# Patient Record
Sex: MALE | Race: WHITE | Employment: OTHER | URBAN - METROPOLITAN AREA
[De-identification: names, ages, dates, MRNs, and addresses within clinical notes are randomized per-mention and may not be internally consistent; named-entity substitution may affect disease eponyms.]

---

## 2019-07-14 ENCOUNTER — APPOINTMENT (OUTPATIENT)
Dept: GENERAL RADIOLOGY | Age: 54
End: 2019-07-14

## 2019-07-14 ENCOUNTER — HOSPITAL ENCOUNTER (EMERGENCY)
Age: 54
Discharge: HOME | End: 2019-07-14

## 2019-07-14 VITALS
OXYGEN SATURATION: 99 % | TEMPERATURE: 98.4 F | DIASTOLIC BLOOD PRESSURE: 66 MMHG | WEIGHT: 195.99 LBS | RESPIRATION RATE: 18 BRPM | HEIGHT: 73 IN | BODY MASS INDEX: 25.98 KG/M2 | HEART RATE: 88 BPM | SYSTOLIC BLOOD PRESSURE: 108 MMHG

## 2019-07-14 DIAGNOSIS — S80.12XA CONTUSION OF LEFT LOWER LEG, INITIAL ENCOUNTER: Primary | ICD-10-CM

## 2019-07-14 LAB
A/G RATIO: 1.5 (ref 1.1–2.2)
ALBUMIN SERPL-MCNC: 3.8 G/DL (ref 3.4–5)
ALP BLD-CCNC: 67 U/L (ref 40–129)
ALT SERPL-CCNC: 12 U/L (ref 10–40)
ANION GAP SERPL CALCULATED.3IONS-SCNC: 11 MMOL/L (ref 3–16)
AST SERPL-CCNC: 15 U/L (ref 15–37)
BASOPHILS ABSOLUTE: 0 K/UL (ref 0–0.2)
BASOPHILS RELATIVE PERCENT: 0.7 %
BILIRUB SERPL-MCNC: <0.2 MG/DL (ref 0–1)
BUN BLDV-MCNC: 10 MG/DL (ref 7–20)
CALCIUM SERPL-MCNC: 9.1 MG/DL (ref 8.3–10.6)
CHLORIDE BLD-SCNC: 101 MMOL/L (ref 99–110)
CO2: 26 MMOL/L (ref 21–32)
CREAT SERPL-MCNC: 0.9 MG/DL (ref 0.9–1.3)
D DIMER: <200 NG/ML DDU (ref 0–229)
EOSINOPHILS ABSOLUTE: 0.5 K/UL (ref 0–0.6)
EOSINOPHILS RELATIVE PERCENT: 7.1 %
GFR AFRICAN AMERICAN: >60
GFR NON-AFRICAN AMERICAN: >60
GLOBULIN: 2.5 G/DL
GLUCOSE BLD-MCNC: 94 MG/DL (ref 70–99)
HCT VFR BLD CALC: 38.6 % (ref 40.5–52.5)
HEMOGLOBIN: 12.9 G/DL (ref 13.5–17.5)
LYMPHOCYTES ABSOLUTE: 2.3 K/UL (ref 1–5.1)
LYMPHOCYTES RELATIVE PERCENT: 32.6 %
MCH RBC QN AUTO: 31.2 PG (ref 26–34)
MCHC RBC AUTO-ENTMCNC: 33.4 G/DL (ref 31–36)
MCV RBC AUTO: 93.3 FL (ref 80–100)
MONOCYTES ABSOLUTE: 0.6 K/UL (ref 0–1.3)
MONOCYTES RELATIVE PERCENT: 8.7 %
NEUTROPHILS ABSOLUTE: 3.6 K/UL (ref 1.7–7.7)
NEUTROPHILS RELATIVE PERCENT: 50.9 %
PDW BLD-RTO: 13.3 % (ref 12.4–15.4)
PLATELET # BLD: 262 K/UL (ref 135–450)
PMV BLD AUTO: 7.8 FL (ref 5–10.5)
POTASSIUM SERPL-SCNC: 4 MMOL/L (ref 3.5–5.1)
RBC # BLD: 4.14 M/UL (ref 4.2–5.9)
SODIUM BLD-SCNC: 138 MMOL/L (ref 136–145)
TOTAL PROTEIN: 6.3 G/DL (ref 6.4–8.2)
WBC # BLD: 7.1 K/UL (ref 4–11)

## 2019-07-14 PROCEDURE — 99283 EMERGENCY DEPT VISIT LOW MDM: CPT

## 2019-07-14 PROCEDURE — 80053 COMPREHEN METABOLIC PANEL: CPT

## 2019-07-14 PROCEDURE — 85025 COMPLETE CBC W/AUTO DIFF WBC: CPT

## 2019-07-14 PROCEDURE — 85379 FIBRIN DEGRADATION QUANT: CPT

## 2019-07-14 PROCEDURE — 73560 X-RAY EXAM OF KNEE 1 OR 2: CPT

## 2019-07-14 ASSESSMENT — PAIN DESCRIPTION - LOCATION: LOCATION: KNEE

## 2019-07-14 ASSESSMENT — PAIN DESCRIPTION - ONSET: ONSET: ON-GOING

## 2019-07-14 ASSESSMENT — PAIN DESCRIPTION - ORIENTATION: ORIENTATION: LEFT

## 2019-07-14 ASSESSMENT — PAIN SCALES - GENERAL: PAINLEVEL_OUTOF10: 5

## 2019-07-14 ASSESSMENT — ENCOUNTER SYMPTOMS: COLOR CHANGE: 1

## 2019-07-14 ASSESSMENT — PAIN - FUNCTIONAL ASSESSMENT: PAIN_FUNCTIONAL_ASSESSMENT: PREVENTS OR INTERFERES SOME ACTIVE ACTIVITIES AND ADLS

## 2019-07-14 ASSESSMENT — PAIN DESCRIPTION - PROGRESSION: CLINICAL_PROGRESSION: GRADUALLY WORSENING

## 2019-07-14 ASSESSMENT — PAIN DESCRIPTION - PAIN TYPE: TYPE: ACUTE PAIN

## 2019-07-14 ASSESSMENT — PAIN DESCRIPTION - FREQUENCY: FREQUENCY: INTERMITTENT

## 2019-07-14 ASSESSMENT — PAIN DESCRIPTION - DESCRIPTORS: DESCRIPTORS: ACHING

## 2019-07-15 NOTE — ED PROVIDER NOTES
as needed for Pain      ranitidine (ZANTAC) 150 MG tablet Take 1 tablet by mouth 2 times daily, Disp-60 tablet, R-3             ALLERGIES     Codeine    FAMILY HISTORY     No family history on file. No family status information on file. SOCIAL HISTORY      reports that he has been smoking cigarettes. He has been smoking about 1.00 pack per day. He does not have any smokeless tobacco history on file. He reports that he does not drink alcohol or use drugs. PHYSICAL EXAM    (up to 7 for level 4, 8 or more for level 5)     ED Triage Vitals [07/14/19 1733]   BP Temp Temp Source Pulse Resp SpO2 Height Weight   102/64 98.4 °F (36.9 °C) Oral 86 16 97 % 6' 1\" (1.854 m) 195 lb 15.8 oz (88.9 kg)     Physical Exam   Constitutional: He is oriented to person, place, and time. He appears well-developed and well-nourished. No distress. HENT:   Head: Normocephalic and atraumatic. Cardiovascular: Normal rate and intact distal pulses. Pulmonary/Chest: Effort normal.   Musculoskeletal: Normal range of motion. Left knee: He exhibits normal range of motion. Tenderness found. Legs:  Neurological: He is alert and oriented to person, place, and time. No cranial nerve deficit. Skin: Skin is warm. Psychiatric: He has a normal mood and affect. His behavior is normal.   Nursing note and vitals reviewed. DIAGNOSTIC RESULTS     RADIOLOGY:   Non-plain film images such as CT, Ultrasound and MRI are read by the radiologist. Plain radiographic images are visualized and preliminarily interpreted by EUN Roy with the below findings:    Reviewed radiologist dictation. Interpretation per the Radiologist below, if available at the time of this note:    XR KNEE LEFT (1-2 VIEWS)   Final Result   No acute osseous abnormality of the left knee. Tricompartment osteoarthritic   changes with small joint effusion.                LABS:  Labs Reviewed   CBC WITH AUTO DIFFERENTIAL - Abnormal; Notable for the following components:       Result Value    RBC 4.14 (*)     Hemoglobin 12.9 (*)     Hematocrit 38.6 (*)     All other components within normal limits    Narrative:     Performed at:  [facility]  [address] [place], [place] [address]   Phone [phone]   COMPREHENSIVE METABOLIC PANEL - Abnormal; Notable for the following components: Total Protein 6.3 (*)     All other components within normal limits    Narrative:     Performed at:  [facility]  [address] [place], [place] [address]   Phone [phone]   D-DIMER, QUANTITATIVE    Narrative:     Performed at:  [facility]  [address] [place], [place] [address]   Phone [phone]       All other labs were within normal range or not returned as of this dictation. EMERGENCY DEPARTMENT COURSE and DIFFERENTIAL DIAGNOSIS/MDM:   Vitals:    Vitals:    07/14/19 1733 07/14/19 2026   BP: 102/64 108/66   Pulse: 86 88   Resp: 16 18   Temp: 98.4 °F (36.9 °C) 98.4 °F (36.9 °C)   TempSrc: Oral Oral   SpO2: 97% 99%   Weight: 195 lb 15.8 oz (88.9 kg)    Height: 6' 1\" (1.854 m)      I discussed with Chester Martin and/or family the exam results, diagnosis, care, prognosis, reasons to return and the importance of follow up. Patient and/or family is in full agreement with plan and all questions have been answered. Specific discharge instructions explained, including reasons to return to the emergency department. Chester Martin is well appearing, non-toxic, and afebrile at the time of discharge. Patient has some swelling and bruising to the left lateral proximal calf just below the knee. X-ray shows no bony abnormality. D-dimer is negative. Platelets are normal.  Discharged with instructions to take Tylenol follow-up with orthopedics and return for any new, worsening or other concerns.     I estimate there is LOW risk for FRACTURE, COMPARTMENT SYNDROME,

## 2021-10-24 ENCOUNTER — APPOINTMENT (OUTPATIENT)
Dept: CT IMAGING | Age: 56
End: 2021-10-24

## 2021-10-24 ENCOUNTER — HOSPITAL ENCOUNTER (EMERGENCY)
Age: 56
Discharge: HOME | End: 2021-10-24
Attending: EMERGENCY MEDICINE

## 2021-10-24 VITALS
DIASTOLIC BLOOD PRESSURE: 73 MMHG | OXYGEN SATURATION: 97 % | WEIGHT: 176.37 LBS | HEART RATE: 64 BPM | HEIGHT: 73 IN | BODY MASS INDEX: 23.37 KG/M2 | RESPIRATION RATE: 13 BRPM | SYSTOLIC BLOOD PRESSURE: 102 MMHG | TEMPERATURE: 99 F

## 2021-10-24 DIAGNOSIS — R91.8 PULMONARY NODULES: ICD-10-CM

## 2021-10-24 DIAGNOSIS — R07.89 COSTOCHONDRAL CHEST PAIN: Primary | ICD-10-CM

## 2021-10-24 DIAGNOSIS — Z72.0 TOBACCO ABUSE: ICD-10-CM

## 2021-10-24 LAB
A/G RATIO: 1.6 (ref 1.1–2.2)
ALBUMIN SERPL-MCNC: 3.9 G/DL (ref 3.4–5)
ALP BLD-CCNC: 77 U/L (ref 40–129)
ALT SERPL-CCNC: 12 U/L (ref 10–40)
ANION GAP SERPL CALCULATED.3IONS-SCNC: 8 MMOL/L (ref 3–16)
AST SERPL-CCNC: 13 U/L (ref 15–37)
BASOPHILS ABSOLUTE: 0.1 K/UL (ref 0–0.2)
BASOPHILS RELATIVE PERCENT: 1 %
BILIRUB SERPL-MCNC: 0.3 MG/DL (ref 0–1)
BUN BLDV-MCNC: 11 MG/DL (ref 7–20)
CALCIUM SERPL-MCNC: 9.1 MG/DL (ref 8.3–10.6)
CHLORIDE BLD-SCNC: 104 MMOL/L (ref 99–110)
CO2: 25 MMOL/L (ref 21–32)
CREAT SERPL-MCNC: 0.9 MG/DL (ref 0.9–1.3)
EOSINOPHILS ABSOLUTE: 0.2 K/UL (ref 0–0.6)
EOSINOPHILS RELATIVE PERCENT: 3.5 %
GFR AFRICAN AMERICAN: >60
GFR NON-AFRICAN AMERICAN: >60
GLOBULIN: 2.5 G/DL
GLUCOSE BLD-MCNC: 102 MG/DL (ref 70–99)
HCT VFR BLD CALC: 41 % (ref 40.5–52.5)
HEMOGLOBIN: 13.9 G/DL (ref 13.5–17.5)
LYMPHOCYTES ABSOLUTE: 1.6 K/UL (ref 1–5.1)
LYMPHOCYTES RELATIVE PERCENT: 23.4 %
MCH RBC QN AUTO: 30.4 PG (ref 26–34)
MCHC RBC AUTO-ENTMCNC: 33.9 G/DL (ref 31–36)
MCV RBC AUTO: 89.7 FL (ref 80–100)
MONOCYTES ABSOLUTE: 0.6 K/UL (ref 0–1.3)
MONOCYTES RELATIVE PERCENT: 8 %
NEUTROPHILS ABSOLUTE: 4.5 K/UL (ref 1.7–7.7)
NEUTROPHILS RELATIVE PERCENT: 64.1 %
PDW BLD-RTO: 13.2 % (ref 12.4–15.4)
PLATELET # BLD: 214 K/UL (ref 135–450)
PMV BLD AUTO: 7.2 FL (ref 5–10.5)
POTASSIUM REFLEX MAGNESIUM: 4.4 MMOL/L (ref 3.5–5.1)
RBC # BLD: 4.57 M/UL (ref 4.2–5.9)
SODIUM BLD-SCNC: 137 MMOL/L (ref 136–145)
TOTAL PROTEIN: 6.4 G/DL (ref 6.4–8.2)
TROPONIN: <0.01 NG/ML
WBC # BLD: 7 K/UL (ref 4–11)

## 2021-10-24 PROCEDURE — 71260 CT THORAX DX C+: CPT

## 2021-10-24 PROCEDURE — 36415 COLL VENOUS BLD VENIPUNCTURE: CPT

## 2021-10-24 PROCEDURE — 93005 ELECTROCARDIOGRAM TRACING: CPT | Performed by: EMERGENCY MEDICINE

## 2021-10-24 PROCEDURE — 85025 COMPLETE CBC W/AUTO DIFF WBC: CPT

## 2021-10-24 PROCEDURE — 6360000002 HC RX W HCPCS: Performed by: EMERGENCY MEDICINE

## 2021-10-24 PROCEDURE — 84484 ASSAY OF TROPONIN QUANT: CPT

## 2021-10-24 PROCEDURE — 6360000004 HC RX CONTRAST MEDICATION: Performed by: EMERGENCY MEDICINE

## 2021-10-24 PROCEDURE — 96374 THER/PROPH/DIAG INJ IV PUSH: CPT

## 2021-10-24 PROCEDURE — 80053 COMPREHEN METABOLIC PANEL: CPT

## 2021-10-24 PROCEDURE — 99284 EMERGENCY DEPT VISIT MOD MDM: CPT

## 2021-10-24 PROCEDURE — 2580000003 HC RX 258: Performed by: EMERGENCY MEDICINE

## 2021-10-24 RX ORDER — 0.9 % SODIUM CHLORIDE 0.9 %
1000 INTRAVENOUS SOLUTION INTRAVENOUS ONCE
Status: COMPLETED | OUTPATIENT
Start: 2021-10-24 | End: 2021-10-24

## 2021-10-24 RX ORDER — METHYLPREDNISOLONE 4 MG/1
TABLET ORAL
Qty: 1 KIT | Refills: 0 | Status: SHIPPED | OUTPATIENT
Start: 2021-10-24 | End: 2021-10-30

## 2021-10-24 RX ORDER — KETOROLAC TROMETHAMINE 30 MG/ML
15 INJECTION, SOLUTION INTRAMUSCULAR; INTRAVENOUS ONCE
Status: COMPLETED | OUTPATIENT
Start: 2021-10-24 | End: 2021-10-24

## 2021-10-24 RX ADMIN — IOPAMIDOL 100 ML: 755 INJECTION, SOLUTION INTRAVENOUS at 14:11

## 2021-10-24 RX ADMIN — KETOROLAC TROMETHAMINE 15 MG: 30 INJECTION, SOLUTION INTRAMUSCULAR at 13:31

## 2021-10-24 RX ADMIN — SODIUM CHLORIDE 1000 ML: 9 INJECTION, SOLUTION INTRAVENOUS at 13:32

## 2021-10-24 ASSESSMENT — PAIN DESCRIPTION - PAIN TYPE
TYPE: ACUTE PAIN
TYPE: ACUTE PAIN

## 2021-10-24 ASSESSMENT — PAIN DESCRIPTION - LOCATION
LOCATION: CHEST
LOCATION: STERNUM

## 2021-10-24 ASSESSMENT — PAIN SCALES - GENERAL
PAINLEVEL_OUTOF10: 3
PAINLEVEL_OUTOF10: 2
PAINLEVEL_OUTOF10: 3

## 2021-10-24 ASSESSMENT — PAIN DESCRIPTION - ORIENTATION: ORIENTATION: MID

## 2021-10-24 ASSESSMENT — PAIN DESCRIPTION - DESCRIPTORS: DESCRIPTORS: ACHING

## 2021-10-24 NOTE — ED PROVIDER NOTES
CHIEF COMPLAINT  Chest Pain (States he was moving AC units yesterday and now his sternum is sore, he states started about 4am.  States he had this pain on and off for about 3 -4 months )      HISTORY OF PRESENT ILLNESS  Andrew Silke is a 50 y.o. male with a history of GERD, 1 pack/day smoker, pseudogout who presents to the ED complaining of chest wall pain. Patient reports symptoms ongoing intermittently for the past 3 to 4 months. Pain resumed today around 4 AM.  Patient states he was moving air conditioning units yesterday and believes this may have exacerbated it. Has not been seen by a physician since onset of symptoms and does not have a PCP. Reports taking NSAIDs without resolution of his discomfort. Patient states the pain is sharp, moderate in intensity, located in the upper sternum and proximal clavicles, worse with movement, cough, sneezing, deep breathing, palpation. Denies any prior cardiac or pulmonary history. He denies diaphoresis, palpitations, near syncope, nausea, vomiting, productive cough, fever, hemoptysis. No other complaints, modifying factors or associated symptoms. I have reviewed the following from the nursing documentation. Past Medical History:   Diagnosis Date    GERD (gastroesophageal reflux disease)      History reviewed. No pertinent surgical history. History reviewed. No pertinent family history.   Social History     Socioeconomic History    Marital status: Single     Spouse name: Not on file    Number of children: Not on file    Years of education: Not on file    Highest education level: Not on file   Occupational History    Not on file   Tobacco Use    Smoking status: Current Every Day Smoker     Packs/day: 1.00     Types: Cigarettes    Smokeless tobacco: Never Used   Substance and Sexual Activity    Alcohol use: No    Drug use: Yes     Types: Marijuana    Sexual activity: Not on file   Other Topics Concern    Not on file   Social History Narrative  Not on file     Social Determinants of Health     Financial Resource Strain:     Difficulty of Paying Living Expenses:    Food Insecurity:     Worried About Running Out of Food in the Last Year:     [address] in the Last Year:    Transportation Needs:     Lack of Transportation (Medical):  Lack of Transportation (Non-Medical):    Physical Activity:     Days of Exercise per Week:     Minutes of Exercise per Session:    Stress:     Feeling of Stress :    Social Connections:     Frequency of Communication with Friends and Family:     Frequency of Social Gatherings with Friends and Family:     Attends [employer] Services:     Active Member of Clubs or Organizations:     Attends Club or Organization Meetings:     Marital Status:    Intimate Partner Violence:     Fear of Current or Ex-Partner:     Emotionally Abused:     Physically Abused:     Sexually Abused:      No current facility-administered medications for this encounter. Current Outpatient Medications   Medication Sig Dispense Refill    esomeprazole (NEXIUM) 20 MG delayed release capsule Take 1 capsule by mouth every morning (before breakfast) for 14 days 14 capsule 0    methylPREDNISolone (MEDROL, ALANNAH,) 4 MG tablet By mouth. 1 kit 0    ibuprofen (ADVIL;MOTRIN) 800 MG tablet Take 800 mg by mouth every 6 hours as needed for Pain      ranitidine (ZANTAC) 150 MG tablet Take 1 tablet by mouth 2 times daily 60 tablet 3     Allergies   Allergen Reactions    Codeine Itching       REVIEW OF SYSTEMS  10 systems reviewed, pertinent positives per HPI otherwise noted to be negative. PHYSICAL EXAM  /73   Pulse 64   Temp 99 °F (37.2 °C) (Temporal)   Resp 13   Ht 6' 1\" (1.854 m)   Wt 176 lb 5.9 oz (80 kg)   SpO2 97%   BMI 23.27 kg/m²   GENERAL APPEARANCE: Awake and alert. Cooperative. No acute distress. HEAD: Normocephalic. Atraumatic. EYES: PERRL. EOM's grossly intact.    ENT: Mucous membranes are moist.   NECK: Supple, Patient with apparent musculoskeletal chest pain which is reproducible on palpation and nonexertional.  We had a lengthy discussion about his pulmonary nodules and the importance of follow-up within 3 to 6 months for another CT scan, specifically discussing the possibility of lung cancer. Patient also encouraged to quit smoking. Advised to return to the ER immediately with any concerns. Patient was given scripts for the following medications. I counseled patient how to take these medications. New Prescriptions    ESOMEPRAZOLE (NEXIUM) 20 MG DELAYED RELEASE CAPSULE    Take 1 capsule by mouth every morning (before breakfast) for 14 days    METHYLPREDNISOLONE (MEDROL, ALANNAH,) 4 MG TABLET    By mouth. CLINICAL IMPRESSION  1. Costochondral chest pain    2. Pulmonary nodules    3. Tobacco abuse        Blood pressure 102/73, pulse 64, temperature 99 °F (37.2 °C), temperature source Temporal, resp. rate 13, height 6' 1\" (1.854 m), weight 176 lb 5.9 oz (80 kg), SpO2 97 %. [facility] stable condition.         Isatu Perez MD  10/24/21 0281

## 2021-10-24 NOTE — ED NOTES
Discharge and education instructions reviewed. Patient verbalized understanding, teach-back successful. Patient denied questions at this time. No acute distress noted. Patient instructed to follow-up as noted - return to emergency department if symptoms worsen. Patient verbalized understanding. Discharged per EDMD with discharge instructions.           Dane Baxter RN  10/24/21 9019

## 2021-10-24 NOTE — ED TRIAGE NOTES
Patient arrived via private car for complaints of sternum pain, he states he was moving AC units yesterday and felt fine, about 1am the pain started. States it is worse with movement and it he pushes the area. States he has had this pain on and off the last 3-4 months.

## 2021-10-25 LAB
EKG ATRIAL RATE: 61 BPM
EKG DIAGNOSIS: NORMAL
EKG P AXIS: 64 DEGREES
EKG P-R INTERVAL: 136 MS
EKG Q-T INTERVAL: 392 MS
EKG QRS DURATION: 100 MS
EKG QTC CALCULATION (BAZETT): 394 MS
EKG R AXIS: 72 DEGREES
EKG T AXIS: 49 DEGREES
EKG VENTRICULAR RATE: 61 BPM

## 2021-10-25 PROCEDURE — 93010 ELECTROCARDIOGRAM REPORT: CPT | Performed by: INTERNAL MEDICINE

## 2021-10-28 ENCOUNTER — TELEPHONE (OUTPATIENT)
Dept: CASE MANAGEMENT | Age: 56
End: 2021-10-28

## 2021-10-28 NOTE — TELEPHONE ENCOUNTER
Patient seen in ED 10/24/21. Positive smoking history. CT Chest impression 8mm nodular opacity in RML. ED MD discussed importance of lung nodule follow up with patient. No referral for pulmonology & no scheduled appointment. Please advise. Thank you.

## 2021-10-29 NOTE — TELEPHONE ENCOUNTER
Spoke to wife Mally Ding. She will have [facility] call back to schedule an appointment. Per Dr. Nida Holland next available appointment is fine.

## 2022-02-15 ENCOUNTER — APPOINTMENT (OUTPATIENT)
Dept: GENERAL RADIOLOGY | Age: 57
End: 2022-02-15

## 2022-02-15 ENCOUNTER — HOSPITAL ENCOUNTER (EMERGENCY)
Age: 57
Discharge: HOME | End: 2022-02-15
Attending: EMERGENCY MEDICINE

## 2022-02-15 VITALS
BODY MASS INDEX: 24.49 KG/M2 | HEART RATE: 69 BPM | RESPIRATION RATE: 16 BRPM | OXYGEN SATURATION: 99 % | WEIGHT: 184.75 LBS | DIASTOLIC BLOOD PRESSURE: 76 MMHG | SYSTOLIC BLOOD PRESSURE: 123 MMHG | HEIGHT: 73 IN | TEMPERATURE: 98.2 F

## 2022-02-15 DIAGNOSIS — M25.472 LEFT ANKLE SWELLING: Primary | ICD-10-CM

## 2022-02-15 LAB
A/G RATIO: 1.2 (ref 1.1–2.2)
ALBUMIN SERPL-MCNC: 3.9 G/DL (ref 3.4–5)
ALP BLD-CCNC: 88 U/L (ref 40–129)
ALT SERPL-CCNC: 12 U/L (ref 10–40)
ANION GAP SERPL CALCULATED.3IONS-SCNC: 13 MMOL/L (ref 3–16)
AST SERPL-CCNC: 13 U/L (ref 15–37)
BASOPHILS ABSOLUTE: 0.1 K/UL (ref 0–0.2)
BASOPHILS RELATIVE PERCENT: 1.1 %
BILIRUB SERPL-MCNC: 0.3 MG/DL (ref 0–1)
BUN BLDV-MCNC: 11 MG/DL (ref 7–20)
CALCIUM SERPL-MCNC: 9.2 MG/DL (ref 8.3–10.6)
CHLORIDE BLD-SCNC: 100 MMOL/L (ref 99–110)
CO2: 24 MMOL/L (ref 21–32)
CREAT SERPL-MCNC: 1 MG/DL (ref 0.9–1.3)
D DIMER: 1.29 UG/ML FEU
EOSINOPHILS ABSOLUTE: 0.4 K/UL (ref 0–0.6)
EOSINOPHILS RELATIVE PERCENT: 6 %
GFR AFRICAN AMERICAN: >60
GFR NON-AFRICAN AMERICAN: >60
GLUCOSE BLD-MCNC: 92 MG/DL (ref 70–99)
HCT VFR BLD CALC: 37.8 % (ref 40.5–52.5)
HEMOGLOBIN: 13.1 G/DL (ref 13.5–17.5)
LYMPHOCYTES ABSOLUTE: 2.7 K/UL (ref 1–5.1)
LYMPHOCYTES RELATIVE PERCENT: 46.4 %
MCH RBC QN AUTO: 30.2 PG (ref 26–34)
MCHC RBC AUTO-ENTMCNC: 34.6 G/DL (ref 31–36)
MCV RBC AUTO: 87.3 FL (ref 80–100)
MONOCYTES ABSOLUTE: 0.6 K/UL (ref 0–1.3)
MONOCYTES RELATIVE PERCENT: 9.6 %
NEUTROPHILS ABSOLUTE: 2.3 K/UL (ref 1.7–7.7)
NEUTROPHILS RELATIVE PERCENT: 36.9 %
PDW BLD-RTO: 13.5 % (ref 12.4–15.4)
PLATELET # BLD: 242 K/UL (ref 135–450)
PMV BLD AUTO: 7 FL (ref 5–10.5)
POTASSIUM REFLEX MAGNESIUM: 3.9 MMOL/L (ref 3.5–5.1)
RBC # BLD: 4.33 M/UL (ref 4.2–5.9)
SEDIMENTATION RATE, ERYTHROCYTE: 25 MM/HR (ref 0–20)
SODIUM BLD-SCNC: 137 MMOL/L (ref 136–145)
TOTAL PROTEIN: 7.1 G/DL (ref 6.4–8.2)
WBC # BLD: 6.1 K/UL (ref 4–11)

## 2022-02-15 PROCEDURE — 36415 COLL VENOUS BLD VENIPUNCTURE: CPT

## 2022-02-15 PROCEDURE — 85379 FIBRIN DEGRADATION QUANT: CPT

## 2022-02-15 PROCEDURE — 85025 COMPLETE CBC W/AUTO DIFF WBC: CPT

## 2022-02-15 PROCEDURE — 80053 COMPREHEN METABOLIC PANEL: CPT

## 2022-02-15 PROCEDURE — 73610 X-RAY EXAM OF ANKLE: CPT

## 2022-02-15 PROCEDURE — 85652 RBC SED RATE AUTOMATED: CPT

## 2022-02-15 PROCEDURE — 73630 X-RAY EXAM OF FOOT: CPT

## 2022-02-15 PROCEDURE — 99283 EMERGENCY DEPT VISIT LOW MDM: CPT

## 2022-02-15 RX ORDER — PREDNISONE 20 MG/1
40 TABLET ORAL DAILY
Qty: 14 TABLET | Refills: 0 | Status: SHIPPED | OUTPATIENT
Start: 2022-02-15 | End: 2022-02-22

## 2022-02-15 ASSESSMENT — PAIN DESCRIPTION - ORIENTATION: ORIENTATION: LEFT

## 2022-02-15 ASSESSMENT — ENCOUNTER SYMPTOMS
NAUSEA: 0
RHINORRHEA: 0
WHEEZING: 0
VOMITING: 0
DIARRHEA: 0
EYE DISCHARGE: 0
ABDOMINAL PAIN: 0
SORE THROAT: 0
EYE PAIN: 0
COUGH: 0
EYE REDNESS: 0
BACK PAIN: 0
SHORTNESS OF BREATH: 0

## 2022-02-15 ASSESSMENT — PAIN DESCRIPTION - DESCRIPTORS: DESCRIPTORS: ACHING

## 2022-02-15 ASSESSMENT — PAIN DESCRIPTION - LOCATION: LOCATION: ANKLE

## 2022-02-15 ASSESSMENT — PAIN SCALES - GENERAL
PAINLEVEL_OUTOF10: 4
PAINLEVEL_OUTOF10: 4

## 2022-02-15 ASSESSMENT — PAIN DESCRIPTION - PAIN TYPE: TYPE: ACUTE PAIN

## 2022-02-15 NOTE — ED PROVIDER NOTES
[address] [facility]  eMERGENCY dEPARTMENT eNCOUnter        Pt Name: Boris Gusman  MRN: [id number]  [place] 1965  Date of evaluation: 2/15/2022  Provider: Britton Hoskins MD  PCP: . None (Inactive)      CHIEF COMPLAINT       Chief Complaint   Patient presents with    Joint Swelling     pt. c/o left ankle swelling for 3 weeks, no injury       HISTORY OFPRESENT ILLNESS   (Location/Symptom, Timing/Onset, Context/Setting, Quality, Duration, Modifying Factors,Severity)  Note limiting factors. Boris Gusman is a 49 y.o. male       Location/Symptom: Left ankle and foot swelling  Timing/Onset: 3 weeks  Context/Setting: No injury. He does have a history of problems with both knees. He is actually scheduled to see orthopedics in 2 days to have his knees injected. Quality: He is complaining of pain in both knees but that is more of a chronic issue. Duration: Again the swelling in the ankle and foot is been going on about 3 weeks  Modifying Factors: Nothing specific. There has not been any injury. He is a smoker. There is a history of vascular disease in the family but not DVT  Severity: 4 out of 10    Nursing Noteswere all reviewed and agreed with or any disagreements were addressed  in the HPI. REVIEW OF SYSTEMS    (2-9 systems for level 4, 10 or more for level 5)     Review of Systems   Constitutional: Negative for chills, fatigue and fever. HENT: Negative for ear pain, rhinorrhea and sore throat. Eyes: Negative for pain, discharge, redness and visual disturbance. Respiratory: Negative for cough, shortness of breath and wheezing. Cardiovascular: Negative for chest pain, palpitations and leg swelling. Gastrointestinal: Negative for abdominal pain, diarrhea, nausea and vomiting. Genitourinary: Negative for difficulty urinating and dysuria. Musculoskeletal: Positive for arthralgias. Negative for back pain and myalgias.         Positive per HPI   Skin: Negative for rash. Allergic/Immunologic: Negative for environmental allergies. Neurological: Negative for dizziness, seizures, syncope and headaches. Hematological: Negative for adenopathy. Psychiatric/Behavioral: Negative for suicidal ideas. The patient is not nervous/anxious. PAST MEDICAL HISTORY     Past Medical History:   Diagnosis Date    GERD (gastroesophageal reflux disease)     Pseudogout          SURGICAL HISTORY     Past Surgical History:   Procedure Laterality Date    KNEE SURGERY Left          CURRENTMEDICATIONS       Previous Medications    IBUPROFEN (ADVIL;MOTRIN) 800 MG TABLET    Take 800 mg by mouth every 6 hours as needed for Pain       ALLERGIES     Codeine    FAMILY HISTORY     History reviewed. No pertinent family history. SOCIAL HISTORY       Social History     Socioeconomic History    Marital status: Single     Spouse name: None    Number of children: None    Years of education: None    Highest education level: None   Occupational History    None   Tobacco Use    Smoking status: Current Every Day Smoker     Packs/day: 0.50     Types: Cigarettes    Smokeless tobacco: Never Used   Vaping Use    Vaping Use: Never used   Substance and Sexual Activity    Alcohol use: No    Drug use: Yes     Types: Marijuana (Weed)     Comment: has medical card    Sexual activity: None   Other Topics Concern    None   Social History Narrative    None     Social Determinants of Health     Financial Resource Strain:     Difficulty of Paying Living Expenses: Not on file   Food Insecurity:     Worried About Running Out of Food in the Last Year: Not on file    Estrella of Food in the Last Year: Not on file   Transportation Needs:     Lack of Transportation (Medical): Not on file    Lack of Transportation (Non-Medical):  Not on file   Physical Activity:     Days of Exercise per Week: Not on file    Minutes of Exercise per Session: Not on file   Stress:     Feeling of Stress : Not on file   Social Connections:     Frequency of Communication with Friends and Family: Not on file    Frequency of Social Gatherings with Friends and Family: Not on file    Attends Mandaeism Services: Not on file    Active Member of Clubs or Organizations: Not on file    Attends Club or Organization Meetings: Not on file    Marital Status: Not on file   Intimate Partner Violence:     Fear of Current or Ex-Partner: Not on file    Emotionally Abused: Not on file    Physically Abused: Not on file    Sexually Abused: Not on file   Housing Stability:     Unable to Pay for Housing in the Last Year: Not on file    Number of [place] in the Last Year: Not on file    Unstable Housing in the Last Year: Not on file       SCREENINGS             PHYSICAL EXAM    (up to 7 for level 4, 8 or more for level 5)     ED Triage Vitals [02/15/22 1748]   BP Temp Temp Source Pulse Resp SpO2 Height Weight   123/76 98.2 °F (36.8 °C) Oral 73 16 -- 6' 1\" (1.854 m) 184 lb 11.9 oz (83.8 kg)      height is 6' 1\" (1.854 m) and weight is 184 lb 11.9 oz (83.8 kg). His oral temperature is 98.2 °F (36.8 °C). His blood pressure is 123/76 and his pulse is 69. His respiration is 16 and oxygen saturation is 99%. Physical Exam  Vitals and nursing note reviewed. Constitutional:       Appearance: He is well-developed. He is not diaphoretic. HENT:      Head: Normocephalic and atraumatic. Right Ear: External ear normal.      Left Ear: External ear normal.   Eyes:      General: No scleral icterus. Right eye: No discharge. Left eye: No discharge. Conjunctiva/sclera: Conjunctivae normal.   Neck:      Trachea: No tracheal deviation. Pulmonary:      Effort: Pulmonary effort is normal. No respiratory distress. Breath sounds: No stridor. Musculoskeletal:         General: Normal range of motion. Cervical back: Normal range of motion. Left lower leg: Edema present.       Comments: He has some osteoarthritic changes in both knees. He does have a palpable effusion in the right knee. The left ankle and foot are 1+ swollen and there is pitting edema. More prominent on the lateral malleolus on the left side. He also does have some mild swelling of the left shin compared to the right. He has excellent pulses. There is no warmth or erythema. Skin:     General: Skin is warm and dry. Neurological:      Mental Status: He is alert and oriented to person, place, and time.       Coordination: Coordination normal.   Psychiatric:         Behavior: Behavior normal.         DIAGNOSTIC RESULTS   LABS:    Results for orders placed or performed during the hospital encounter of 02/15/22   CBC Auto Differential   Result Value Ref Range    WBC 6.1 4.0 - 11.0 K/uL    RBC 4.33 4.20 - 5.90 M/uL    Hemoglobin 13.1 (L) 13.5 - 17.5 g/dL    Hematocrit 37.8 (L) 40.5 - 52.5 %    MCV 87.3 80.0 - 100.0 fL    MCH 30.2 26.0 - 34.0 pg    MCHC 34.6 31.0 - 36.0 g/dL    RDW 13.5 12.4 - 15.4 %    Platelets 736 688 - 433 K/uL    MPV 7.0 5.0 - 10.5 fL    Neutrophils % 36.9 %    Lymphocytes % 46.4 %    Monocytes % 9.6 %    Eosinophils % 6.0 %    Basophils % 1.1 %    Neutrophils Absolute 2.3 1.7 - 7.7 K/uL    Lymphocytes Absolute 2.7 1.0 - 5.1 K/uL    Monocytes Absolute 0.6 0.0 - 1.3 K/uL    Eosinophils Absolute 0.4 0.0 - 0.6 K/uL    Basophils Absolute 0.1 0.0 - 0.2 K/uL   Comprehensive Metabolic Panel w/ Reflex to MG   Result Value Ref Range    Sodium 137 136 - 145 mmol/L    Potassium reflex Magnesium 3.9 3.5 - 5.1 mmol/L    Chloride 100 99 - 110 mmol/L    CO2 24 21 - 32 mmol/L    Anion Gap 13 3 - 16    Glucose 92 70 - 99 mg/dL    BUN 11 7 - 20 mg/dL    CREATININE 1.0 0.9 - 1.3 mg/dL    GFR Non-African American >60 >60    GFR African American >60 >60    Calcium 9.2 8.3 - 10.6 mg/dL    Total Protein 7.1 6.4 - 8.2 g/dL    Albumin 3.9 3.4 - 5.0 g/dL    Albumin/Globulin Ratio 1.2 1.1 - 2.2    Total Bilirubin 0.3 0.0 - 1.0 mg/dL    Alkaline Phosphatase 88 40 - 129 U/L    ALT 12 10 - 40 U/L    AST 13 (L) 15 - 37 U/L   Sedimentation Rate   Result Value Ref Range    Sed Rate 25 (H) 0 - 20 mm/Hr   D-Dimer, Quantitative   Result Value Ref Range    D-Dimer, Quant 1.29 (H) <0.50 ug/mL FEU       All other labs were within normal range or not returned as of this dictation. EKG: All EKG's are interpreted by the Emergency Department Physician who either signs orCo-signs this chart in the absence of a cardiologist.    None    RADIOLOGY:   Non-plain film images such as CT, Ultrasound and MRI are read by the radiologist. Celena Campbell radiographic images are visualized and preliminarily interpreted by the  EDProvider with the below findings:    XR ANKLE LEFT (MIN 3 VIEWS)    Result Date: 2/15/2022  EXAMINATION: THREE XRAY VIEWS OF THE LEFT ANKLE 2/15/2022 6:00 pm COMPARISON: None. HISTORY: ORDERING SYSTEM PROVIDED HISTORY: swelling without injury TECHNOLOGIST PROVIDED HISTORY: Reason for exam:->swelling without injury Reason for Exam: pt has pain and swelling in his entire ankle and foot for 3 weeks with no injury FINDINGS: Bones: No acute fracture. No aggressive osseous lesion. Joints: Joint spaces maintained. Normal alignment. Soft tissues: Soft tissue swelling over the lateral malleolus. No acute fracture or malalignment. Soft tissue swelling over the lateral malleolus. XR FOOT LEFT (MIN 3 VIEWS)    Result Date: 2/15/2022  EXAMINATION: THREE XRAY VIEWS OF THE LEFT FOOT 2/15/2022 6:00 pm COMPARISON: None. HISTORY: ORDERING SYSTEM PROVIDED HISTORY: swelling without injury TECHNOLOGIST PROVIDED HISTORY: Reason for exam:->swelling without injury Reason for Exam: pt has pain and swelling in his entire ankle and foot for 3 weeks with no injury Additional signs and symptoms: has more pain around his 5th MT area FINDINGS: Bones: No acute fracture. No aggressive osseous lesion. Joints: Joint spaces maintained. Normal alignment.  Soft tissues: No focal soft tissue swelling. No acute fracture or malalignment. PROCEDURES   Unless otherwise noted below, none     Procedures    CRITICAL CARE TIME   N/A    CONSULTS:  None    EMERGENCY DEPARTMENT COURSE and DIFFERENTIAL DIAGNOSIS/MDM:   Vitals:    Vitals:    02/15/22 1748 02/15/22 1853   BP: 123/76    Pulse: 73 69   Resp: 16 16   Temp: 98.2 °F (36.8 °C)    TempSrc: Oral    SpO2:  99%   Weight: 184 lb 11.9 oz (83.8 kg)    Height: 6' 1\" (1.854 m)        Patient was given the following medications:  Medications - No data to display    Stable. Patient will status has been going on for 3 weeks. He is not having any chest pain or shortness of breath. He is supposed to have an orthopedic procedure in 2 days of restart the Eliquis now he will not be able to get that done. I discussed this at length with the patient and his wife. The plan is that I will write prescriptions for both prednisone and Eliquis. If the test is positive for DVT he will start the Eliquis and if not he will start the prednisone and see orthopedics on Thursday. He does have some problems in both lower extremities so I am going to ultrasound both sides. FINAL IMPRESSION      1. Left ankle swelling          DISPOSITION/PLAN    DISPOSITION Decision To Discharge 02/15/2022 06:55:57 PM      [facility]  [phone]    If symptoms worsen      DISCHARGE MEDICATIONS:  New Prescriptions    APIXABAN STARTER PACK (ELIQUIS DVT/PE STARTER PACK) 5 MG TBPK TABLET    Take 1 tablet by mouth See Admin Instructions You will take 2 tablets twice daily for 7 days then 1 tablet twice daily.     PREDNISONE (DELTASONE) 20 MG TABLET    Take 2 tablets by mouth daily for 7 days       DISCONTINUED MEDICATIONS:  Discontinued Medications    ESOMEPRAZOLE (NEXIUM) 20 MG DELAYED RELEASE CAPSULE    Take 1 capsule by mouth every morning (before breakfast) for 14 days    RANITIDINE (ZANTAC) 150 MG TABLET    Take 1 tablet by mouth 2 times daily              (Please note that portions of this note were completed with a voice recognition program.  Efforts were made to editthe dictations but occasionally words are mis-transcribed.)    Claudio Park MD (electronically signed)            Claudio Park MD  02/15/22 3554

## 2022-02-15 NOTE — Clinical Note
Jennefer Leventhal was seen and treated in our emergency department on 2/15/2022. He may return to work on 02/18/2022. If you have any questions or concerns, please don't hesitate to call.       Gary Kyle MD

## 2022-02-17 ENCOUNTER — ANTI-COAG VISIT (OUTPATIENT)
Dept: PHARMACY | Age: 57
End: 2022-02-17

## 2022-02-17 ENCOUNTER — HOSPITAL ENCOUNTER (OUTPATIENT)
Dept: VASCULAR LAB | Age: 57
Discharge: HOME | End: 2022-02-17

## 2022-02-17 DIAGNOSIS — M25.472 LEFT ANKLE SWELLING: ICD-10-CM

## 2022-02-17 PROCEDURE — 93970 EXTREMITY STUDY: CPT

## 2022-02-17 PROCEDURE — 99211 OFF/OP EST MAY X REQ PHY/QHP: CPT

## 2022-02-17 NOTE — PROGRESS NOTES
Parth Sanchez was recently in the ED with concerns for a DVT. They are here today for a DVT Study to rule out a DVT. This visit was completed as a:  THIS VISIT WAS PERFORMED AS: An in person visit. Protocols were followed with precautions to reduce the spread of COVID-19. The DVT study today was found to be negative. Procedure       Type of Study:        Veins:Lower Extremities DVT Study, VASC EXTREMITY VENOUS DUPLEX BILATERAL.        Vascular Sonographer Report       Indications for Study:Swelling.       Additional Indications: Ankle swelling   Was given prescription for Eliquis but did not fill yet       Impressions   Right Impression   No evidence of deep vein or superficial vein thrombosis involving the right   lower extremity. Left Impression   No evidence of deep vein or superficial vein thrombosis involving the left   lower extremity. Evidence of a cystic structure seen in the popliteal space of the left leg. Parth Sanchez was not given the 4 day supply of Eliquis tablets in the ED. They report they declined because of the upcoming \"knee drainage procedure. \"  They were given a prescription for Eliquis if needed. Prescription for prednisone given in the ED. Advised to continue this. It was recommended that they follow up with their PCP within 7 days. He is seeing Dr. Bernarda James, orthopedics, this week to [facility] his knees drained. \" They will discuss this with him, but will also make his PCP aware. They were counseled on signs and symptoms of DVT and if symptoms should worsen to seek medical attention.      [facility]  Anticoagulation Service  DVT Program  [phone]      CLINICAL PHARMACY CONSULT: MED RECONCILIATION/REVIEW ADDENDUM    For Pharmacy Admin Tracking Only     Intervention Detail:    Total # of Interventions Recommended: 0   Total # of Interventions Accepted: 0   Time Spent (min): 20

## 2022-02-17 NOTE — PATIENT INSTRUCTIONS
1. Do not take apixaban (Eliquis) the blood thinner. 2. Follow up with your primary care physician in the next 7 days  3. Continue any other medications that were prescribed in the ED.

## 2025-06-30 ENCOUNTER — HOSPITAL ENCOUNTER (OUTPATIENT)
Age: 60
Discharge: HOME | End: 2025-06-30

## 2025-06-30 ENCOUNTER — HOSPITAL ENCOUNTER (OUTPATIENT)
Dept: GENERAL RADIOLOGY | Age: 60
Discharge: HOME | End: 2025-06-30

## 2025-06-30 DIAGNOSIS — M54.6 THORACIC SPINE PAIN: ICD-10-CM

## 2025-06-30 PROCEDURE — 72070 X-RAY EXAM THORAC SPINE 2VWS: CPT
